# Patient Record
Sex: MALE | Race: WHITE | Employment: OTHER | URBAN - METROPOLITAN AREA
[De-identification: names, ages, dates, MRNs, and addresses within clinical notes are randomized per-mention and may not be internally consistent; named-entity substitution may affect disease eponyms.]

---

## 2018-11-29 ENCOUNTER — HOSPITAL ENCOUNTER (OUTPATIENT)
Dept: GENERAL RADIOLOGY | Age: 73
Discharge: HOME | End: 2018-12-01
Payer: MEDICARE

## 2018-11-29 ENCOUNTER — HOSPITAL ENCOUNTER (OUTPATIENT)
Age: 73
Discharge: HOME | End: 2018-12-01
Payer: MEDICARE

## 2018-11-29 DIAGNOSIS — M25.561 RIGHT KNEE PAIN, UNSPECIFIED CHRONICITY: ICD-10-CM

## 2018-11-29 PROCEDURE — 73564 X-RAY EXAM KNEE 4 OR MORE: CPT

## 2018-11-29 PROCEDURE — 72170 X-RAY EXAM OF PELVIS: CPT

## 2019-06-12 ENCOUNTER — PREP FOR PROCEDURE (OUTPATIENT)
Dept: SURGERY | Age: 74
End: 2019-06-12

## 2019-06-12 RX ORDER — SODIUM CHLORIDE 9 MG/ML
INJECTION, SOLUTION INTRAVENOUS CONTINUOUS
Status: CANCELLED | OUTPATIENT
Start: 2019-06-12

## 2019-06-12 NOTE — H&P
Name: Suzi Cho               : 1945 Sex: M  Age: 75 yrs  Acct#:  [id number]           Patient was referred by Danilo Sanchez MD.  Patient's primary care provider is Danilo Sanchez MD.  CC: Patient presents for colon cancer screening. HPI: Colorectal cancer screening, but denies appetite change, weight loss, colitis, colon cancer, colon polyps, constipation, diarrhea, diverticulitis, diverticulosis, family history of colon cancer, hemorrhoids and rectal bleeding. 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 He underwent a CT to evaluate and was found to have new liver lesions. His oncologist wanted a colonoscopy to evaluate and rule out neoplasm. Meds Prior to Visit:  Humalog     Levothyroxine Sodium     Metoprolol Tartrate     Colestipol HCL     Lisinopril     Simvastatin     Vitamin D        Allergies:  NKDA    PMH:  Problem List: Screening for malignant neoplasm of colon, Neoplasm of uncertain behavior of liver and/or biliary passages  (Health Maintenance)  (Medical Problems)  Surgical Hx:  Right Knee Surgery - (857)  Appendectomy - ()  Open Heart Surgery - ()  Gallbladder Removed - ()  Reviewed and updated. FH:  Father:  Heart Disease  . Mother:  Aortic Aneurysm  . Reviewed and updated. SH:  Personal Habits:  Smoking: Patient has never smoked. Alcohol: Denies alcohol use. Drug Use: Denies Drug Use. Daily Caffeine: Uses Caffeine. Reviewed and updated. Date: 2019  Was the patient queried about smoking behavior? Yes  No  Does the patient currently smoke? Smoking: Patient has never smoked. ROS:  Const: Reports night sweats, but denies anorexia, anxiety, fatigue, weight gain and weight loss. Eyes: Denies eye symptoms. ENMT: Denies ear symptoms. Denies nasal symptoms. Denies mouth or throat symptoms. CV: Denies hypertension and other cardiovascular symptoms.   Resp: unusual rashes or suspicious lesions. Neuro: Alert and oriented x3. Mood is normal.  Cranial Nerves: Cranial nerves II-XII grossly intact. Psych: Cognition: Judgment and insight are grossly intact.          Assessment #1: Hx Z12.11 Encounter for screening for malignant neoplasm of colon   Care Plan:              Comments       :  Screening colonoscopy  Risks, benefits, alternatives, complications, need for barium enema as well as colonic perforation all discussed     Assessment #2: Hx P12.1 Neoplasm of uncertain behavior of liver, gallbladder and bile ducts   Care Plan:              Comments       :    New liver lesions of unknown etiology  Undergoing biopsy        Seen by:

## 2019-06-18 RX ORDER — LEVOTHYROXINE SODIUM 0.15 MG/1
150 TABLET ORAL DAILY
COMMUNITY

## 2019-06-18 RX ORDER — MULTIVIT-MIN/IRON/FOLIC ACID/K 18-600-40
2000 CAPSULE ORAL DAILY
COMMUNITY

## 2019-06-18 NOTE — PROGRESS NOTES
[facility] PRE-ADMISSION TESTING INSTRUCTIONS    The Preadmission Testing patient is instructed accordingly using the following criteria (check applicable):    ARRIVAL INSTRUCTIONS:  [x] Parking the day of Surgery is located in the Main Entrance lot. Upon entering the door, make an immediate right to the surgery reception desk    [] [id number] is available Monday through Friday 6 am to 6 pm    [x] Bring photo ID and insurance card    [x] Bring in a copy of Living will or Durable Power of  papers. [x] Please be sure to arrange for responsible adult to provide transportation to and from the hospital    [x] Please arrange for responsible adult to be with you for the 24 hour period post procedure due to having anesthesia      GENERAL INSTRUCTIONS:    [x] Nothing by mouth after midnight, including gum, candy, mints or water    [x] You may brush your teeth, but do not swallow any water    [x] Take medications as instructed with 1-2 oz of water    [x] Stop herbal supplements and vitamins 5 days prior to procedure    [x] Follow preop dosing of blood thinners per physician instructions    [x] Take 1/2 dose of evening insulin, but no insulin after midnight    [] No oral diabetic medications after midnight    [x] If diabetic and have low blood sugar or feel symptomatic, take 1-2oz apple juice only    [] Bring inhalers day of surgery    [] Bring C-PAP/ Bi-Pap day of surgery    [] Bring urine specimen day of surgery    [x] Shower or bath with soap, lather and rinse well, AM of Surgery, no lotion, powders or creams to surgical site    [x] Follow bowel prep as instructed per surgeon    [x] No tobacco products within 24 hours of surgery     [x] No alcohol or illegal drug use within 24 hours of surgery.     [x] Jewelry, body piercing's, eyeglasses, contact lenses and dentures are not permitted into surgery (bring cases)      [] Please do not wear any nail polish, make up or hair products on the day of surgery    [x] If not already done, you can expect a call from registration    [x] You can expect a call the business day prior to procedure to notify you if your arrival time changes    [x] If you receive a survey after surgery we would greatly appreciate your comments    [] Parent/guardian of a minor must accompany their child and remain on the premises  the entire time they are under our care     [] Pediatric patients may bring favorite toy, blanket or comfort item with them    [] A caregiver or family member must remain with the patient during their stay if they are mentally handicapped, have dementia, disoriented or unable to use a call light or would be a safety concern if left unattended    [x] Please notify surgeon if you develop any illness between now and time of surgery (cold, cough, sore throat, fever, nausea, vomiting) or any signs of infections  including skin, wounds, and dental.    [x]  The Outpatient Pharmacy is available to fill your prescription here on your day of surgery, ask your preop nurse for details    [] Other instructions  EDUCATIONAL MATERIALS PROVIDED:    [] PAT Preoperative Education Packet/Booklet     [] Medication List    [] Fluoroscopy Information Pamphlet    [] Transfusion bracelet applied with instructions    [] Joint replacement video reviewed    [] Shower with soap, lather and rinse well, and use CHG wipes provided the evening before surgery as instructed

## 2019-06-21 ENCOUNTER — HOSPITAL ENCOUNTER (OUTPATIENT)
Age: 74
Setting detail: OUTPATIENT SURGERY
Discharge: HOME | End: 2019-06-21
Attending: SURGERY | Admitting: SURGERY
Payer: MEDICARE

## 2019-06-21 ENCOUNTER — ANESTHESIA (OUTPATIENT)
Dept: ENDOSCOPY | Age: 74
End: 2019-06-21
Payer: MEDICARE

## 2019-06-21 ENCOUNTER — ANESTHESIA EVENT (OUTPATIENT)
Dept: ENDOSCOPY | Age: 74
End: 2019-06-21
Payer: MEDICARE

## 2019-06-21 VITALS
SYSTOLIC BLOOD PRESSURE: 127 MMHG | DIASTOLIC BLOOD PRESSURE: 69 MMHG | HEIGHT: 72 IN | BODY MASS INDEX: 27.09 KG/M2 | HEART RATE: 63 BPM | OXYGEN SATURATION: 100 % | TEMPERATURE: 97.2 F | WEIGHT: 200 LBS | RESPIRATION RATE: 16 BRPM

## 2019-06-21 VITALS — OXYGEN SATURATION: 99 % | DIASTOLIC BLOOD PRESSURE: 55 MMHG | SYSTOLIC BLOOD PRESSURE: 91 MMHG

## 2019-06-21 LAB — METER GLUCOSE: 121 MG/DL (ref 74–99)

## 2019-06-21 PROCEDURE — 6360000002 HC RX W HCPCS: Performed by: NURSE ANESTHETIST, CERTIFIED REGISTERED

## 2019-06-21 PROCEDURE — 7100000011 HC PHASE II RECOVERY - ADDTL 15 MIN: Performed by: SURGERY

## 2019-06-21 PROCEDURE — 2580000003 HC RX 258: Performed by: NURSE ANESTHETIST, CERTIFIED REGISTERED

## 2019-06-21 PROCEDURE — 3609027000 HC COLONOSCOPY: Performed by: SURGERY

## 2019-06-21 PROCEDURE — 3700000001 HC ADD 15 MINUTES (ANESTHESIA): Performed by: SURGERY

## 2019-06-21 PROCEDURE — 82962 GLUCOSE BLOOD TEST: CPT

## 2019-06-21 PROCEDURE — 2709999900 HC NON-CHARGEABLE SUPPLY: Performed by: SURGERY

## 2019-06-21 PROCEDURE — 3700000000 HC ANESTHESIA ATTENDED CARE: Performed by: SURGERY

## 2019-06-21 PROCEDURE — 7100000010 HC PHASE II RECOVERY - FIRST 15 MIN: Performed by: SURGERY

## 2019-06-21 RX ORDER — SODIUM CHLORIDE 9 MG/ML
INJECTION, SOLUTION INTRAVENOUS CONTINUOUS PRN
Status: DISCONTINUED | OUTPATIENT
Start: 2019-06-21 | End: 2019-06-21 | Stop reason: SDUPTHER

## 2019-06-21 RX ORDER — PROPOFOL 10 MG/ML
INJECTION, EMULSION INTRAVENOUS PRN
Status: DISCONTINUED | OUTPATIENT
Start: 2019-06-21 | End: 2019-06-21 | Stop reason: SDUPTHER

## 2019-06-21 RX ORDER — SODIUM CHLORIDE 9 MG/ML
INJECTION, SOLUTION INTRAVENOUS CONTINUOUS
Status: DISCONTINUED | OUTPATIENT
Start: 2019-06-21 | End: 2019-06-21 | Stop reason: HOSPADM

## 2019-06-21 RX ADMIN — PROPOFOL 50 MG: 10 INJECTION, EMULSION INTRAVENOUS at 07:55

## 2019-06-21 RX ADMIN — PROPOFOL 150 MG: 10 INJECTION, EMULSION INTRAVENOUS at 07:46

## 2019-06-21 RX ADMIN — PROPOFOL 50 MG: 10 INJECTION, EMULSION INTRAVENOUS at 07:50

## 2019-06-21 RX ADMIN — SODIUM CHLORIDE: 9 INJECTION, SOLUTION INTRAVENOUS at 07:28

## 2019-06-21 ASSESSMENT — PAIN SCALES - GENERAL
PAINLEVEL_OUTOF10: 0
PAINLEVEL_OUTOF10: 0

## 2019-06-21 NOTE — ANESTHESIA PRE PROCEDURE
Department of Anesthesiology  Preprocedure Note       Name:  Kimberley Schmidt   Age:  75 y.o.  :  1945                                          MRN:  [id number]         Date:  2019      Surgeon: Clint Lozano):  Yvan Cuadra MD    Procedure: COLORECTAL CANCER SCREENING, NOT HIGH RISK (N/A )    Medications prior to admission:   Prior to Admission medications    Medication Sig Start Date End Date Taking? Authorizing Provider   levothyroxine (SYNTHROID) 150 MCG tablet Take 150 mcg by mouth Daily   Yes Historical Provider, MD   Cholecalciferol (VITAMIN D) 2000 units CAPS capsule Take 2,000 Units by mouth daily Ld 2019   Yes Historical Provider, MD   HUMALOG MIX 75/25 KWIKPEN (75-25) 100 UNIT/ML SUPN injection pen 42 Units 2 times daily (with meals) Pt instructed to take 1/2 evening dose night before colonoscopy 3/23/17  Yes Historical Provider, MD   lisinopril (PRINIVIL;ZESTRIL) 5 MG tablet Take 5 mg by mouth daily  3/23/17  Yes Historical Provider, MD   simvastatin (ZOCOR) 40 MG tablet Take 40 mg by mouth nightly. Yes Historical Provider, MD   metoprolol (LOPRESSOR) 25 MG tablet Take 25 mg by mouth 2 times daily Instructed to take am of procedure   Yes Historical Provider, MD   Colestipol HCl (COLESTID PO) Take 1 g by mouth 2 times daily. Yes Historical Provider, MD   aspirin 81 MG EC tablet Take 81 mg by mouth daily Ld 2019 per dr. Saravanan Bundy MD       Current medications:    Current Facility-Administered Medications   Medication Dose Route Frequency Provider Last Rate Last Dose    0.9 % sodium chloride infusion   Intravenous Continuous Yvan Cuadra MD           Allergies:     Allergies   Allergen Reactions    Pollen Extract Other (See Comments)     Itchy eyes       Problem List:    Patient Active Problem List   Diagnosis Code    Coronary atherosclerosis of native coronary artery I25.10    Diabetes mellitus ([facility] [address] E11.9    Hypertension I10    Autoimmune hepatitis 10/08/2010    PROT 7.4 10/08/2010    BILITOT 1.0 10/08/2010    ALKPHOS 66 10/08/2010    AST 31 10/08/2010    ALT 18 10/08/2010       POC Tests: No results for input(s): POCGLU, POCNA, POCK, POCCL, POCBUN, POCHEMO, POCHCT in the last 72 hours. Coags: No results found for: PROTIME, INR, APTT    HCG (If Applicable): No results found for: PREGTESTUR, PREGSERUM, HCG, HCGQUANT     ABGs: No results found for: PHART, PO2ART, GRS0HWU, ERZ6JUT, BEART, R1SBRJSV     Type & Screen (If Applicable):  No results found for: [facility]    Anesthesia Evaluation  Patient summary reviewed and Nursing notes reviewed no history of anesthetic complications:   Airway: Mallampati: II  TM distance: >3 FB   Neck ROM: full  Mouth opening: > = 3 FB Dental: normal exam         Pulmonary:Negative Pulmonary ROS breath sounds clear to auscultation                             Cardiovascular:  Exercise tolerance: good (>4 METS),   (+) hypertension:, CAD:, CABG/stent:, hyperlipidemia        Rhythm: regular  Rate: normal           Beta Blocker:  Dose within 24 Hrs         Neuro/Psych:   Negative Neuro/Psych ROS              GI/Hepatic/Renal:   (+) bowel prep,           Endo/Other:    (+) DiabetesType I DM, , hypothyroidism: arthritis:., malignancy/cancer. Abdominal:           Vascular: negative vascular ROS. Anesthesia Plan      MAC     ASA 3             Anesthetic plan and risks discussed with patient and spouse.                       Navneet Ramey MD   6/21/2019

## 2019-06-21 NOTE — H&P
Patient was referred by Julissa Borrego MD.  Patient's primary care provider is Julissa Borrego MD.  CC: Patient presents for colon cancer screening.     HPI: Colorectal cancer screening, but denies appetite change, weight loss, colitis, colon cancer, colon polyps, constipation, diarrhea, diverticulitis, diverticulosis, family history of colon cancer, hemorrhoids and rectal bleeding. 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 He underwent a CT to evaluate and was found to have new liver lesions. His oncologist wanted a colonoscopy to evaluate and rule out neoplasm.       Meds Prior to Visit:  Humalog     Levothyroxine Sodium     Metoprolol Tartrate     Colestipol HCL     Lisinopril     Simvastatin     Vitamin D         Allergies:  NKDA     PMH:  Problem List: Screening for malignant neoplasm of colon, Neoplasm of uncertain behavior of liver and/or biliary passages  (Health Maintenance)  (Medical Problems)  Surgical Hx:  Right Knee Surgery - (441)  Appendectomy - ()  Open Heart Surgery - ()  Gallbladder Removed - ()  Reviewed and updated. FH:  Father:  Heart Disease  . Mother:  Aortic Aneurysm  . Reviewed and updated. SH:  Personal Habits:  Smoking: Patient has never smoked. Alcohol: Denies alcohol use. Drug Use: Denies Drug Use. Daily Caffeine: Uses Caffeine. Reviewed and updated. Date: 2019  Was the patient queried about smoking behavior? Yes  No  Does the patient currently smoke? Smoking: Patient has never smoked.     ROS:  Const: Reports night sweats, but denies anorexia, anxiety, fatigue, weight gain and weight loss. Eyes: Denies eye symptoms. ENMT: Denies ear symptoms. Denies nasal symptoms. Denies mouth or throat symptoms. CV: Denies hypertension and other cardiovascular symptoms. Resp: Denies respiratory symptoms.   GI: Denies hepatitis, liver disease and other gastrointestinal symptoms. Musculo: Denies musculoskeletal symptoms. Skin: Denies skin, hair and nail symptoms. Breast: Denies breast problems. Neuro: Denies neurologic symptoms. Psych: Denies depression and substance abuse. Endocrine: Reports diabetes and thyroid disease but denies kidney disease. Hema/Lymph: Denies anemia, blood disease, cancer and past transfusion. Allergy/Immuno: Reports other allergic/immunologic symptoms, but denies immunosuppression. Reviewed and updated.           Exam:  Const: Appears healthy and well developed. No signs of apparent distress present. Head/Face: Atraumatic, normocephalic on inspection. Eyes: Conjunctivae pink. Pupils equal, round and reactive to light. Sclerae clear and anicteric. ENMT: External ears WNL. External nose WNL. Lips: Appear normal and healthy. Neck: Normal to inspection. Normal to palpation. No masses appreciated. Trachea midline. Thyroid is normal in size. No jugular venous distention. Resp: Respiration rate is normal. No wheezing. Clear to auscultation bilaterally. No rales or rhonchi appreciated over the lungs bilaterally. CV: Rate is regular. Rhythm is regular. S1 is normal. S2 is normal. No heart murmur appreciated. Extremities: No clubbing or cyanosis. No edema of the lower limbs bilaterally. Abdomen: No visible herniations. No abdominal scars. Positive bowel sounds in all quadrants. Abdomen is soft, nontender, and nondistended without guarding, rigidity or rebound tenderness. No palpable abdominal aneurysms present. No palpable hepatosplenomegaly. Lymph: No palpable lymphadenopathy in the cervical, supraclavicular, axillary and inguinal region(s). Musculo: Walks with a normal gait. Upper Extremities: Normal to inspection. ROM: Full ROM bilaterally. Lower Extremities: Normal to inspection. ROM: Full ROM bilaterally. Skin: Skin warm and dry with no evidence of unusual rashes or suspicious lesions. Neuro: Alert and oriented x3.  Mood is normal.  Cranial Nerves: Cranial nerves II-XII grossly intact. Psych: Cognition: Judgment and insight are grossly intact.                     Assessment #1: Hx Z12.11 Encounter for screening for malignant neoplasm of colon   Care Plan:              Comments       :  Screening colonoscopy  Risks, benefits, alternatives, complications, need for barium enema as well as colonic perforation all discussed      Assessment #2: Hx K25.8 Neoplasm of uncertain behavior of liver, gallbladder and bile ducts   Care Plan:              Comments       :    New liver lesions of unknown etiology  Undergoing biopsy

## 2019-06-25 NOTE — ANESTHESIA POSTPROCEDURE EVALUATION
Department of Anesthesiology  Postprocedure Note    Patient: Dariel Hawkins  MRN: [id number]  YOB: 1945  Date of evaluation: 6/24/2019  Time:  9:00 PM     Procedure Summary     Date:  06/21/19 Room / Location:  [facility] ENDO 03 / [facility] ENDOSCOPY    Anesthesia Start:  0746 Anesthesia Stop:  0800    Procedure:  COLORECTAL CANCER SCREENING, NOT HIGH RISK (N/A ) Diagnosis:  (SCREENING)    Surgeon:  Janki Latham MD Responsible Provider:  Umer Dietz MD    Anesthesia Type:  MAC ASA Status:  3          Anesthesia Type: MAC    Bryson Phase I: Bryson Score: 10    Bryson Phase II: Bryson Score: 10    Last vitals: Reviewed and per EMR flowsheets.        Anesthesia Post Evaluation    Patient location during evaluation: PACU  Patient participation: complete - patient participated  Level of consciousness: awake and alert  Airway patency: patent  Nausea & Vomiting: no vomiting and no nausea  Complications: no  Cardiovascular status: blood pressure returned to baseline  Respiratory status: acceptable  Hydration status: euvolemic

## 2019-06-25 NOTE — OP NOTE
COLONOSCOPY PROCEDURE NOTE    DATE OF PROCEDURE: 6/25/2019    PREOPERATIVE DIAGNOSIS:  Screening     POSTOPERATIVE DIAGNOSIS/FINDINGS:  Normal     SURGEON: Kathy Mireles MD    ASSISTANT: None    OPERATION: Total Colonoscopy     ANESTHESIA: Local monitored anesthesia. CONDITION: Stable    COMPLICATIONS: None. BRIEF HISTORY:  This is a 75 y.o. male who presents with the complaint of screening. It was recommended the patient undergo a colonoscopy. The risks/benefits/alternatives/expected outcomes were explained the the patient. The patient verbalized understanding and agreed to proceed. PROCEDURE:  The patient was brought into the endoscopy suite and placed in the left lateral decubitus position. A digital rectal exam was performed after the initiation of LMAC anesthesia and failed to reveal any obstructing masses or lesions. A colonoscope was inserted into the patient's anus and passed through the rectum, sigmoid, descending, transverse, and ascending colon all the way to the level of the cecum. Visualization of the cecum was confirmed by visualization of the ileo-cecal valve, confluence of the tinea, and by visualization of the light in the RLQ on the anterior abdominal wall. The scope was then withdrawn the entire length of the colon. There were no masses, polyps, or lesions noted. Upon reaching the anus, the scope was retroflexed. There were no significant hemorrhoids noted. The scope was straightened and withdrawn entirely. The patient tolerated the procedure well and there were no complications.         Kathy Mireles MD  6/25/2019

## (undated) DEVICE — GRADUATE TRIANG MEASURE 1000ML BLK PRNT